# Patient Record
Sex: MALE | Race: WHITE | NOT HISPANIC OR LATINO | Employment: OTHER | URBAN - METROPOLITAN AREA
[De-identification: names, ages, dates, MRNs, and addresses within clinical notes are randomized per-mention and may not be internally consistent; named-entity substitution may affect disease eponyms.]

---

## 2024-10-10 ENCOUNTER — HOSPITAL ENCOUNTER (EMERGENCY)
Facility: HOSPITAL | Age: 39
Discharge: PSYCHIATRIC OR SUBSTANCE USE FACILITY | End: 2024-10-10
Attending: INTERNAL MEDICINE
Payer: MEDICAID

## 2024-10-10 VITALS
TEMPERATURE: 97 F | HEIGHT: 70 IN | RESPIRATION RATE: 18 BRPM | HEART RATE: 84 BPM | WEIGHT: 200 LBS | SYSTOLIC BLOOD PRESSURE: 153 MMHG | BODY MASS INDEX: 28.63 KG/M2 | OXYGEN SATURATION: 96 % | DIASTOLIC BLOOD PRESSURE: 99 MMHG

## 2024-10-10 DIAGNOSIS — Z00.8 MEDICAL CLEARANCE FOR PSYCHIATRIC ADMISSION: ICD-10-CM

## 2024-10-10 DIAGNOSIS — F31.2 BIPOLAR AFFECTIVE DISORDER, CURRENTLY MANIC, SEVERE, WITH PSYCHOTIC FEATURES: Primary | ICD-10-CM

## 2024-10-10 LAB
ALBUMIN SERPL-MCNC: 4.2 G/DL (ref 3.5–5)
ALBUMIN/GLOB SERPL: 1.2 RATIO (ref 1.1–2)
ALP SERPL-CCNC: 81 UNIT/L (ref 40–150)
ALT SERPL-CCNC: 203 UNIT/L (ref 0–55)
AMPHET UR QL SCN: NEGATIVE
ANION GAP SERPL CALC-SCNC: 14 MEQ/L
AST SERPL-CCNC: 127 UNIT/L (ref 5–34)
BACTERIA #/AREA URNS AUTO: ABNORMAL /HPF
BARBITURATE SCN PRESENT UR: NEGATIVE
BASOPHILS # BLD AUTO: 0.06 X10(3)/MCL
BASOPHILS NFR BLD AUTO: 0.6 %
BENZODIAZ UR QL SCN: NEGATIVE
BILIRUB SERPL-MCNC: 0.9 MG/DL
BILIRUB UR QL STRIP.AUTO: NEGATIVE
BUN SERPL-MCNC: 9.5 MG/DL (ref 8.9–20.6)
CALCIUM SERPL-MCNC: 10.9 MG/DL (ref 8.4–10.2)
CANNABINOIDS UR QL SCN: NEGATIVE
CHLORIDE SERPL-SCNC: 105 MMOL/L (ref 98–107)
CLARITY UR: CLEAR
CO2 SERPL-SCNC: 22 MMOL/L (ref 22–29)
COCAINE UR QL SCN: NEGATIVE
COLOR UR AUTO: ABNORMAL
CREAT SERPL-MCNC: 1.08 MG/DL (ref 0.73–1.18)
CREAT/UREA NIT SERPL: 9
EOSINOPHIL # BLD AUTO: 0.09 X10(3)/MCL (ref 0–0.9)
EOSINOPHIL NFR BLD AUTO: 0.8 %
ERYTHROCYTE [DISTWIDTH] IN BLOOD BY AUTOMATED COUNT: 11.2 % (ref 11.5–17)
ETHANOL SERPL-MCNC: 13 MG/DL
FENTANYL UR QL SCN: NEGATIVE
GFR SERPLBLD CREATININE-BSD FMLA CKD-EPI: >60 ML/MIN/1.73/M2
GLOBULIN SER-MCNC: 3.4 GM/DL (ref 2.4–3.5)
GLUCOSE SERPL-MCNC: 118 MG/DL (ref 74–100)
GLUCOSE UR QL STRIP: NORMAL
HCT VFR BLD AUTO: 42.3 % (ref 42–52)
HGB BLD-MCNC: 15.1 G/DL (ref 14–18)
HGB UR QL STRIP: NEGATIVE
HYALINE CASTS #/AREA URNS LPF: ABNORMAL /LPF
IMM GRANULOCYTES # BLD AUTO: 0.06 X10(3)/MCL (ref 0–0.04)
IMM GRANULOCYTES NFR BLD AUTO: 0.6 %
KETONES UR QL STRIP: NEGATIVE
LEUKOCYTE ESTERASE UR QL STRIP: NEGATIVE
LYMPHOCYTES # BLD AUTO: 1.48 X10(3)/MCL (ref 0.6–4.6)
LYMPHOCYTES NFR BLD AUTO: 13.6 %
MCH RBC QN AUTO: 35 PG (ref 27–31)
MCHC RBC AUTO-ENTMCNC: 35.7 G/DL (ref 33–36)
MCV RBC AUTO: 97.9 FL (ref 80–94)
MDMA UR QL SCN: NEGATIVE
MONOCYTES # BLD AUTO: 0.77 X10(3)/MCL (ref 0.1–1.3)
MONOCYTES NFR BLD AUTO: 7.1 %
MUCOUS THREADS URNS QL MICRO: ABNORMAL /LPF
NEUTROPHILS # BLD AUTO: 8.43 X10(3)/MCL (ref 2.1–9.2)
NEUTROPHILS NFR BLD AUTO: 77.3 %
NITRITE UR QL STRIP: NEGATIVE
NRBC BLD AUTO-RTO: 0 %
OPIATES UR QL SCN: NEGATIVE
PCP UR QL: NEGATIVE
PH UR STRIP: 6.5 [PH]
PH UR: 6.5 [PH] (ref 3–11)
PLATELET # BLD AUTO: 227 X10(3)/MCL (ref 130–400)
PMV BLD AUTO: 10.8 FL (ref 7.4–10.4)
POTASSIUM SERPL-SCNC: 3.8 MMOL/L (ref 3.5–5.1)
PROT SERPL-MCNC: 7.6 GM/DL (ref 6.4–8.3)
PROT UR QL STRIP: NEGATIVE
RBC # BLD AUTO: 4.32 X10(6)/MCL (ref 4.7–6.1)
RBC #/AREA URNS AUTO: ABNORMAL /HPF
SODIUM SERPL-SCNC: 141 MMOL/L (ref 136–145)
SP GR UR STRIP.AUTO: 1.01 (ref 1–1.03)
SPECIFIC GRAVITY, URINE AUTO (.000) (OHS): 1.01 (ref 1–1.03)
SQUAMOUS #/AREA URNS LPF: ABNORMAL /HPF
TSH SERPL-ACNC: 1.82 UIU/ML (ref 0.35–4.94)
UROBILINOGEN UR STRIP-ACNC: NORMAL
WBC # BLD AUTO: 10.89 X10(3)/MCL (ref 4.5–11.5)
WBC #/AREA URNS AUTO: ABNORMAL /HPF

## 2024-10-10 PROCEDURE — 85025 COMPLETE CBC W/AUTO DIFF WBC: CPT | Performed by: INTERNAL MEDICINE

## 2024-10-10 PROCEDURE — 81001 URINALYSIS AUTO W/SCOPE: CPT | Performed by: INTERNAL MEDICINE

## 2024-10-10 PROCEDURE — 84443 ASSAY THYROID STIM HORMONE: CPT | Performed by: INTERNAL MEDICINE

## 2024-10-10 PROCEDURE — 99285 EMERGENCY DEPT VISIT HI MDM: CPT

## 2024-10-10 PROCEDURE — 82077 ASSAY SPEC XCP UR&BREATH IA: CPT | Performed by: INTERNAL MEDICINE

## 2024-10-10 PROCEDURE — 25000003 PHARM REV CODE 250: Performed by: INTERNAL MEDICINE

## 2024-10-10 PROCEDURE — 80053 COMPREHEN METABOLIC PANEL: CPT | Performed by: INTERNAL MEDICINE

## 2024-10-10 PROCEDURE — 80307 DRUG TEST PRSMV CHEM ANLYZR: CPT | Performed by: INTERNAL MEDICINE

## 2024-10-10 RX ORDER — OLANZAPINE 5 MG/1
10 TABLET, ORALLY DISINTEGRATING ORAL NIGHTLY
Status: DISCONTINUED | OUTPATIENT
Start: 2024-10-10 | End: 2024-10-10 | Stop reason: HOSPADM

## 2024-10-10 RX ORDER — IBUPROFEN 200 MG
1 TABLET ORAL DAILY
Status: DISCONTINUED | OUTPATIENT
Start: 2024-10-10 | End: 2024-10-10

## 2024-10-10 RX ORDER — CLONIDINE HYDROCHLORIDE 0.1 MG/1
0.1 TABLET ORAL
Status: COMPLETED | OUTPATIENT
Start: 2024-10-10 | End: 2024-10-10

## 2024-10-10 RX ADMIN — CLONIDINE HYDROCHLORIDE 0.1 MG: 0.1 TABLET ORAL at 01:10

## 2024-10-10 RX ADMIN — OLANZAPINE 10 MG: 5 TABLET, ORALLY DISINTEGRATING ORAL at 01:10

## 2024-10-10 NOTE — ED PROVIDER NOTES
"Encounter Date: 10/10/2024       History     Chief Complaint   Patient presents with    Psychiatric Evaluation     Patient brought in for a psych evaluation by [facility].      Presents by police due to delusional state. Police states multiple 911 calls for different situations, from shooting to bank theft. Patient states "they" are stealing his personality, "they" stole millions of dollars from his by [employer]. States he called the police and they brought him for an evaluation. Hx of PTSD and depression not taking medications    The history is provided by the patient.     Review of patient's allergies indicates:  No Known Allergies  Past Medical History:   Diagnosis Date    Depression     PTSD (post-traumatic stress disorder)      History reviewed. No pertinent surgical history.  No family history on file.     Review of Systems   Unable to perform ROS: Psychiatric disorder       Physical Exam     Initial Vitals [10/10/24 0010]   BP Pulse Resp Temp SpO2   (!) 165/101 (!) 116 18 97.3 °F (36.3 °C) 96 %      MAP       --         Physical Exam    Nursing note and vitals reviewed.  Constitutional: He appears well-developed and well-nourished. No distress.   HENT:   Head: Normocephalic and atraumatic. Mouth/Throat: Oropharynx is clear and moist.   Eyes: Conjunctivae are normal. Pupils are equal, round, and reactive to light.   Neck: Neck supple. No thyromegaly present. No JVD present.   Normal range of motion.  Cardiovascular:  Regular rhythm, normal heart sounds and intact distal pulses.           Tachycardic   Pulmonary/Chest: Breath sounds normal. No respiratory distress.   Abdominal: Abdomen is soft. Bowel sounds are normal. He exhibits no distension. There is no abdominal tenderness. There is no rebound and no guarding.   Musculoskeletal:         General: No edema. Normal range of motion.      Cervical back: Normal range of motion and neck supple.     Neurological: He is alert and oriented to person, place, and time. " He has normal strength.   Skin: Skin is warm and dry. No rash noted.   Psychiatric: His mood appears anxious. His speech is rapid and/or pressured. He is hyperactive and actively hallucinating. Thought content is paranoid and delusional. Cognition and memory are normal. He expresses inappropriate judgment. He is attentive.         ED Course   Procedures  Labs Reviewed   CBC W/ AUTO DIFFERENTIAL    Narrative:     The following orders were created for panel order CBC auto differential.  Procedure                               Abnormality         Status                     ---------                               -----------         ------                     CBC with Differential[[id number]]                                                        Please view results for these tests on the individual orders.   COMPREHENSIVE METABOLIC PANEL   TSH   URINALYSIS, REFLEX TO URINE CULTURE   DRUG SCREEN, URINE (BEAKER)   ALCOHOL,MEDICAL (ETHANOL)   CBC WITH DIFFERENTIAL          Imaging Results    None          Medications   nicotine 14 mg/24 hr 1 patch (has no administration in time range)   OLANZapine zydis disintegrating tablet 10 mg (has no administration in time range)   cloNIDine tablet 0.1 mg (has no administration in time range)     Medical Decision Making  Amount and/or Complexity of Data Reviewed  Independent Historian:      Details: Police: Multiple 911 calls  Labs: ordered. Decision-making details documented in ED Course.  ECG/medicine tests: ordered and independent interpretation performed. Decision-making details documented in ED Course.  Discussion of management or test interpretation with external provider(s): Case discussed with our psychiatric nurse for transfer. Information will be faxed to local psychiatric institutions for placement. MD will be available for report if needed after nurse report. Patient medically cleared and stable at this time for transfer process.       Risk  OTC drugs.      Additional  MDM:   Differential Diagnosis:   Schizophrenia exacerbation, bipolar psychosis, drug induced psychosis, thyrotoxicosis, renal failure, liver failure, toxydrome, among others                  Medically cleared for psychiatry placement: 10/10/2024  1:04 AM                   Clinical Impression:  Final diagnoses:  [Z00.8] Medical clearance for psychiatric admission  [F31.2] Bipolar affective disorder, currently manic, severe, with psychotic features (Primary)          ED Disposition Condition    Transfer to Psych Facility Fair          ED Prescriptions    None       Follow-up Information    None          John Martin MD  10/10/24 0105

## 2024-12-09 ENCOUNTER — HOSPITAL ENCOUNTER (EMERGENCY)
Facility: HOSPITAL | Age: 39
Discharge: PSYCHIATRIC OR SUBSTANCE USE FACILITY | End: 2024-12-09
Attending: INTERNAL MEDICINE
Payer: MEDICAID

## 2024-12-09 VITALS
HEART RATE: 100 BPM | DIASTOLIC BLOOD PRESSURE: 109 MMHG | TEMPERATURE: 99 F | OXYGEN SATURATION: 100 % | BODY MASS INDEX: 31.18 KG/M2 | SYSTOLIC BLOOD PRESSURE: 177 MMHG | RESPIRATION RATE: 18 BRPM | WEIGHT: 217.81 LBS | HEIGHT: 70 IN

## 2024-12-09 DIAGNOSIS — F22 DELUSIONAL DISORDER: ICD-10-CM

## 2024-12-09 DIAGNOSIS — F23 ACUTE PSYCHOSIS: Primary | ICD-10-CM

## 2024-12-09 LAB
ALBUMIN SERPL-MCNC: 4.7 G/DL (ref 3.5–5)
ALBUMIN/GLOB SERPL: 1.3 RATIO (ref 1.1–2)
ALP SERPL-CCNC: 69 UNIT/L (ref 40–150)
ALT SERPL-CCNC: 26 UNIT/L (ref 0–55)
ANION GAP SERPL CALC-SCNC: 12 MEQ/L
APAP SERPL-MCNC: <3 UG/ML (ref 10–30)
AST SERPL-CCNC: 20 UNIT/L (ref 5–34)
BACTERIA #/AREA URNS AUTO: NORMAL /HPF
BASOPHILS # BLD AUTO: 0.05 X10(3)/MCL
BASOPHILS NFR BLD AUTO: 0.5 %
BILIRUB SERPL-MCNC: 1 MG/DL
BILIRUB UR QL STRIP.AUTO: NEGATIVE
BUN SERPL-MCNC: 13.5 MG/DL (ref 8.9–20.6)
CALCIUM SERPL-MCNC: 10.3 MG/DL (ref 8.4–10.2)
CHLORIDE SERPL-SCNC: 106 MMOL/L (ref 98–107)
CLARITY UR: CLEAR
CO2 SERPL-SCNC: 21 MMOL/L (ref 22–29)
COLOR UR AUTO: NORMAL
CREAT SERPL-MCNC: 0.92 MG/DL (ref 0.72–1.25)
CREAT/UREA NIT SERPL: 15
EOSINOPHIL # BLD AUTO: 0.11 X10(3)/MCL (ref 0–0.9)
EOSINOPHIL NFR BLD AUTO: 1.1 %
ERYTHROCYTE [DISTWIDTH] IN BLOOD BY AUTOMATED COUNT: 11.5 % (ref 11.5–17)
ETHANOL SERPL-MCNC: <10 MG/DL
GFR SERPLBLD CREATININE-BSD FMLA CKD-EPI: >60 ML/MIN/1.73/M2
GLOBULIN SER-MCNC: 3.5 GM/DL (ref 2.4–3.5)
GLUCOSE SERPL-MCNC: 114 MG/DL (ref 74–100)
GLUCOSE UR QL STRIP: NORMAL
HCT VFR BLD AUTO: 50 % (ref 42–52)
HGB BLD-MCNC: 17.9 G/DL (ref 14–18)
HGB UR QL STRIP: NEGATIVE
HYALINE CASTS #/AREA URNS LPF: NORMAL /LPF
IMM GRANULOCYTES # BLD AUTO: 0.04 X10(3)/MCL (ref 0–0.04)
IMM GRANULOCYTES NFR BLD AUTO: 0.4 %
KETONES UR QL STRIP: NEGATIVE
LEUKOCYTE ESTERASE UR QL STRIP: NEGATIVE
LYMPHOCYTES # BLD AUTO: 1.74 X10(3)/MCL (ref 0.6–4.6)
LYMPHOCYTES NFR BLD AUTO: 16.8 %
MCH RBC QN AUTO: 33.8 PG (ref 27–31)
MCHC RBC AUTO-ENTMCNC: 35.8 G/DL (ref 33–36)
MCV RBC AUTO: 94.3 FL (ref 80–94)
MONOCYTES # BLD AUTO: 0.51 X10(3)/MCL (ref 0.1–1.3)
MONOCYTES NFR BLD AUTO: 4.9 %
NEUTROPHILS # BLD AUTO: 7.91 X10(3)/MCL (ref 2.1–9.2)
NEUTROPHILS NFR BLD AUTO: 76.3 %
NITRITE UR QL STRIP: NEGATIVE
NRBC BLD AUTO-RTO: 0 %
PH UR STRIP: 6.5 [PH]
PLATELET # BLD AUTO: 211 X10(3)/MCL (ref 130–400)
PMV BLD AUTO: 11.1 FL (ref 7.4–10.4)
POTASSIUM SERPL-SCNC: 3.7 MMOL/L (ref 3.5–5.1)
PROT SERPL-MCNC: 8.2 GM/DL (ref 6.4–8.3)
PROT UR QL STRIP: NEGATIVE
RBC # BLD AUTO: 5.3 X10(6)/MCL (ref 4.7–6.1)
RBC #/AREA URNS AUTO: NORMAL /HPF
SODIUM SERPL-SCNC: 139 MMOL/L (ref 136–145)
SP GR UR STRIP.AUTO: 1.01 (ref 1–1.03)
SQUAMOUS #/AREA URNS LPF: NORMAL /HPF
TSH SERPL-ACNC: 1.33 UIU/ML (ref 0.35–4.94)
UROBILINOGEN UR STRIP-ACNC: NORMAL
WBC # BLD AUTO: 10.36 X10(3)/MCL (ref 4.5–11.5)
WBC #/AREA URNS AUTO: NORMAL /HPF

## 2024-12-09 PROCEDURE — 84443 ASSAY THYROID STIM HORMONE: CPT | Performed by: INTERNAL MEDICINE

## 2024-12-09 PROCEDURE — 99285 EMERGENCY DEPT VISIT HI MDM: CPT | Mod: 25

## 2024-12-09 PROCEDURE — 82077 ASSAY SPEC XCP UR&BREATH IA: CPT | Performed by: INTERNAL MEDICINE

## 2024-12-09 PROCEDURE — 96372 THER/PROPH/DIAG INJ SC/IM: CPT | Performed by: INTERNAL MEDICINE

## 2024-12-09 PROCEDURE — 85025 COMPLETE CBC W/AUTO DIFF WBC: CPT | Performed by: INTERNAL MEDICINE

## 2024-12-09 PROCEDURE — 80053 COMPREHEN METABOLIC PANEL: CPT | Performed by: INTERNAL MEDICINE

## 2024-12-09 PROCEDURE — 81001 URINALYSIS AUTO W/SCOPE: CPT | Performed by: INTERNAL MEDICINE

## 2024-12-09 PROCEDURE — 80143 DRUG ASSAY ACETAMINOPHEN: CPT | Performed by: INTERNAL MEDICINE

## 2024-12-09 PROCEDURE — 63600175 PHARM REV CODE 636 W HCPCS: Performed by: INTERNAL MEDICINE

## 2024-12-09 RX ORDER — HALOPERIDOL 5 MG/ML
5 INJECTION INTRAMUSCULAR
Status: COMPLETED | OUTPATIENT
Start: 2024-12-09 | End: 2024-12-09

## 2024-12-09 RX ADMIN — HALOPERIDOL LACTATE 5 MG: 5 INJECTION, SOLUTION INTRAMUSCULAR at 07:12

## 2024-12-09 NOTE — ED PROVIDER NOTES
Date: 12/9/2024  Time of Note: 5:26 PM   Source of History:  History obtained from the patient.   Chief complaint:  OPC (Pt brought in on OPC.)      HPI:  Kevin Wasserman is a 39 y.o. year old male with history of  has a past medical history of Depression and PTSD (post-traumatic stress disorder). presenting with OPC (Pt brought in on OPC.)    Review of Systems:    As per HPI and below:  Constitutional: No Fever.  No Chills.  Eyes: No Visual Changes.  ENT: None voiced by patient.    Cardiovascular: No Chest Pain.  Respiratory: No Shortness of breath. No Cough  GastrointestinaI: No Abdominal Pain.  No Nausea No Vomiting. No Diarrhea, No Constipation.  Genitourinary: No Dysuria  Neurologic: No Headache. No New Focal Weakness.  Musculoskeletal: No Back Pain.  Psychiatric:  OPC by mother for being aggressive, delusional, threatening to harm himself in other, problem with substance abuse,    Review of patient's allergies indicates:  No Known Allergies    No current outpatient medications    Past Medical History:   Diagnosis Date    Depression     PTSD (post-traumatic stress disorder)        History reviewed. No pertinent surgical history.    Social History     Tobacco Use    Smoking status: Unknown   Substance Use Topics    Drug use: Never       History reviewed. No pertinent family history.     There are no active problems to display for this patient.      Physical Exam:    Vitals:    12/09/24 1836   BP: (!) 177/109   Pulse: 100   Resp: 18   Temp: 99 °F (37.2 °C)       Nursing note and vital signs reviewed.    Constitutional: No acute distress.  Nontoxic  Eyes: No conjunctival injection.  Extraocular muscles are intact.  ENT: Oropharynx clear.    Cardiovascular: Regular rate and rhythm.  No murmurs.   Respiratory: No Respiratory Distress. Good Bilateral air movement.  No rhonchi. No rales.  Abdomen: Soft.  Not distended.  Nontender.  No guarding.  No rebound. Bowel Sounds Normal.  Musculoskeletal: Good range of motion  all joints.  No Gross deformities Noted.  Skin: No Obvious Rashes seen.    Neurologic:  Awake and Alert.  Cranial Nerves Grossly intact. No focal neurological deficits.  Psychiatry:  Suicidal according to his OPC by mother, also delusional,    Labs that have been ordered have been independently reviewed and interpreted by myself.    Reviewed Nurses Notes and Vitals.  Labs ordered AND reviewed interpreted independently.  Medical Decision Making    Kevin Wasserman is 39 y.o. male who  has a past medical history of Depression and PTSD (post-traumatic stress disorder). arrives in ER with c/o OPC (Pt brought in on OPC.)    Patient's mother got him an OPC saying that he is delusional saying that [facility] killer took his identity among other things.  He is verbally abusive and aggressive towards his mother.  She is afraid of what he may do.  He is threatening to harm himself and others.  Depressed and manic.  Substance abuse.  Police suggested to mother to fill out an OPC.    When I talked to the patient he tells me that the some confusion because his mother has psychosis, to the point that his father committed suicide in 2010, and he is trying to do some stayed planning for her and they are having argument because of that, and she is afraid of him right leg so, so he is staying in a hotel room, and he wants me to clear him to go to the hotel room so that he can take care of the business.  And he wants to reassures me that he will help me in writing some medical papers and published them.  He also tells me that the he is hungry and he wants to be released within this 3 hours and [facility] because he wants to be treated in a [facility] hospital.    Amount and/or Complexity of Data Reviewed  Labs: ordered.    Risk  Prescription drug management.       Orders Placed This Encounter   Procedures    CBC auto differential    Comprehensive metabolic panel    TSH    Urinalysis, Reflex to Urine  Culture    Drug Screen panel, emergency    Ethanol    Acetaminophen level    CBC with Differential    Diet Adult Regular    Vital signs    Undress patient and allow them to wear facility provided apparel.    Direct Psych Observation    PEC/Psych Hold - Physicians Emergency Certificate / 72 Hour Psych Hold     Medications   haloperidol lactate injection 5 mg (5 mg Intramuscular Given 12/9/24 1955)     Admission on 12/09/2024   Component Date Value Ref Range Status    Sodium 12/09/2024 139  136 - 145 mmol/L Final    Potassium 12/09/2024 3.7  3.5 - 5.1 mmol/L Final    Chloride 12/09/2024 106  98 - 107 mmol/L Final    CO2 12/09/2024 21 (L)  22 - 29 mmol/L Final    Glucose 12/09/2024 114 (H)  74 - 100 mg/dL Final    Blood Urea Nitrogen 12/09/2024 13.5  8.9 - 20.6 mg/dL Final    Creatinine 12/09/2024 0.92  0.72 - 1.25 mg/dL Final    Calcium 12/09/2024 10.3 (H)  8.4 - 10.2 mg/dL Final    Protein Total 12/09/2024 8.2  6.4 - 8.3 gm/dL Final    Albumin 12/09/2024 4.7  3.5 - 5.0 g/dL Final    Globulin 12/09/2024 3.5  2.4 - 3.5 gm/dL Final    Albumin/Globulin Ratio 12/09/2024 1.3  1.1 - 2.0 ratio Final    Bilirubin Total 12/09/2024 1.0  <=1.5 mg/dL Final    ALP 12/09/2024 69  40 - 150 unit/L Final    ALT 12/09/2024 26  0 - 55 unit/L Final    AST 12/09/2024 20  5 - 34 unit/L Final    eGFR 12/09/2024 >60  mL/min/1.73/m2 Final    Anion Gap 12/09/2024 12.0  mEq/L Final    BUN/Creatinine Ratio 12/09/2024 15   Final    TSH 12/09/2024 1.331  0.350 - 4.940 uIU/mL Final    Color, UA 12/09/2024 Light-Yellow  Yellow, Light-Yellow, Dark Yellow, Vianca, Straw Final    Appearance, UA 12/09/2024 Clear  Clear Final    Specific Gravity, UA 12/09/2024 1.015  1.005 - 1.030 Final    pH, UA 12/09/2024 6.5  5.0 - 8.5 Final    Protein, UA 12/09/2024 Negative  Negative Final    Glucose, UA 12/09/2024 Normal  Negative, Normal Final    Ketones, UA 12/09/2024 Negative  Negative Final    Blood, UA 12/09/2024 Negative  Negative Final    Bilirubin, UA  12/09/2024 Negative  Negative Final    Urobilinogen, UA 12/09/2024 Normal  0.2, 1.0, Normal Final    Nitrites, UA 12/09/2024 Negative  Negative Final    Leukocyte Esterase, UA 12/09/2024 Negative  Negative Final    RBC, UA 12/09/2024 None Seen  None Seen, 0-2, 3-5, 0-5 /HPF Final    WBC, UA 12/09/2024 0-5  None Seen, 0-2, 3-5, 0-5 /HPF Final    Bacteria, UA 12/09/2024 None Seen  None Seen /HPF Final    Squamous Epithelial Cells, UA 12/09/2024 None Seen  None Seen /HPF Final    Hyaline Casts, UA 12/09/2024 None Seen  None Seen /lpf Final    Ethanol Level 12/09/2024 <10.0  <=10.0 mg/dL Final    Acetaminophen Level 12/09/2024 <3.0 (L)  10.0 - 30.0 ug/ml Final    WBC 12/09/2024 10.36  4.50 - 11.50 x10(3)/mcL Final    RBC 12/09/2024 5.30  4.70 - 6.10 x10(6)/mcL Final    Hgb 12/09/2024 17.9  14.0 - 18.0 g/dL Final    Hct 12/09/2024 50.0  42.0 - 52.0 % Final    MCV 12/09/2024 94.3 (H)  80.0 - 94.0 fL Final    MCH 12/09/2024 33.8 (H)  27.0 - 31.0 pg Final    MCHC 12/09/2024 35.8  33.0 - 36.0 g/dL Final    RDW 12/09/2024 11.5  11.5 - 17.0 % Final    Platelet 12/09/2024 211  130 - 400 x10(3)/mcL Final    MPV 12/09/2024 11.1 (H)  7.4 - 10.4 fL Final    Neut % 12/09/2024 76.3  % Final    Lymph % 12/09/2024 16.8  % Final    Mono % 12/09/2024 4.9  % Final    Eos % 12/09/2024 1.1  % Final    Basophil % 12/09/2024 0.5  % Final    Lymph # 12/09/2024 1.74  0.6 - 4.6 x10(3)/mcL Final    Neut # 12/09/2024 7.91  2.1 - 9.2 x10(3)/mcL Final    Mono # 12/09/2024 0.51  0.1 - 1.3 x10(3)/mcL Final    Eos # 12/09/2024 0.11  0 - 0.9 x10(3)/mcL Final    Baso # 12/09/2024 0.05  <=0.2 x10(3)/mcL Final    IG# 12/09/2024 0.04  0 - 0.04 x10(3)/mcL Final    IG% 12/09/2024 0.4  % Final    NRBC% 12/09/2024 0.0  % Final       No orders to display            Psych Clearance:    At the time of my examination, patient does not appear to have any major medical condition which needs to be addressed by admission to hospital and appears to be in medically  optimal condition to undergo a psychiatric evaluation and treatment as needed in a psych facility, Patient will be in a monitored facility and they can always bring back to our ER or the nearest ER for reevaluation in case develops any other symptoms.  Medically cleared for psychiatry placement: 12/9/2024  6:58 PM  Medically cleared for psychiatry placement: 12/9/2024  6:58 PM          Diagnostic Impression:      ICD-10-CM ICD-9-CM   1. Acute psychosis  F23 298.9   2. Delusional disorder  F22 297.1        Medication List      You have not been prescribed any medications.        ED Disposition Condition    Transfer to Psych Facility Stable          ED Prescriptions    None       Follow-up Information    None          Medication List      You have not been prescribed any medications.          So Peoples MD  12/09/24 1900       So Peoples MD  12/09/24 2024